# Patient Record
Sex: FEMALE
[De-identification: names, ages, dates, MRNs, and addresses within clinical notes are randomized per-mention and may not be internally consistent; named-entity substitution may affect disease eponyms.]

---

## 2018-01-26 PROBLEM — Z00.00 ENCOUNTER FOR PREVENTIVE HEALTH EXAMINATION: Status: ACTIVE | Noted: 2018-01-26

## 2018-01-31 ENCOUNTER — TRANSCRIPTION ENCOUNTER (OUTPATIENT)
Age: 58
End: 2018-01-31

## 2018-01-31 ENCOUNTER — APPOINTMENT (OUTPATIENT)
Dept: ORTHOPEDIC SURGERY | Facility: CLINIC | Age: 58
End: 2018-01-31
Payer: COMMERCIAL

## 2018-01-31 VITALS — BODY MASS INDEX: 29.71 KG/M2 | HEIGHT: 64 IN | WEIGHT: 174 LBS

## 2018-01-31 VITALS — DIASTOLIC BLOOD PRESSURE: 88 MMHG | SYSTOLIC BLOOD PRESSURE: 154 MMHG | HEART RATE: 91 BPM

## 2018-01-31 DIAGNOSIS — Z86.79 PERSONAL HISTORY OF OTHER DISEASES OF THE CIRCULATORY SYSTEM: ICD-10-CM

## 2018-01-31 DIAGNOSIS — Z87.39 PERSONAL HISTORY OF OTHER DISEASES OF THE MUSCULOSKELETAL SYSTEM AND CONNECTIVE TISSUE: ICD-10-CM

## 2018-01-31 DIAGNOSIS — Z78.9 OTHER SPECIFIED HEALTH STATUS: ICD-10-CM

## 2018-01-31 DIAGNOSIS — Z86.39 PERSONAL HISTORY OF OTHER ENDOCRINE, NUTRITIONAL AND METABOLIC DISEASE: ICD-10-CM

## 2018-01-31 PROCEDURE — 99204 OFFICE O/P NEW MOD 45 MIN: CPT | Mod: 25

## 2018-01-31 PROCEDURE — 20611 DRAIN/INJ JOINT/BURSA W/US: CPT | Mod: RT

## 2018-01-31 PROCEDURE — 73564 X-RAY EXAM KNEE 4 OR MORE: CPT | Mod: RT

## 2018-01-31 RX ORDER — DICLOFENAC SODIUM 50 MG/1
50 TABLET, DELAYED RELEASE ORAL
Qty: 60 | Refills: 2 | Status: ACTIVE | COMMUNITY
Start: 2018-01-31 | End: 1900-01-01

## 2018-01-31 RX ORDER — ATORVASTATIN CALCIUM 10 MG/1
10 TABLET, FILM COATED ORAL
Refills: 0 | Status: ACTIVE | COMMUNITY

## 2018-01-31 RX ORDER — HYALURONATE SOD, CROSS-LINKED 30 MG/3 ML
30 SYRINGE (ML) INTRAARTICULAR
Qty: 1 | Refills: 0 | Status: ACTIVE | OUTPATIENT
Start: 2018-01-31

## 2018-01-31 RX ORDER — LISINOPRIL 5 MG/1
5 TABLET ORAL
Refills: 0 | Status: ACTIVE | COMMUNITY

## 2018-01-31 RX ORDER — METFORMIN HYDROCHLORIDE 625 MG/1
TABLET ORAL
Refills: 0 | Status: ACTIVE | COMMUNITY

## 2018-01-31 RX ORDER — GLIPIZIDE 10 MG/1
10 TABLET ORAL
Refills: 0 | Status: ACTIVE | COMMUNITY

## 2018-01-31 RX ORDER — LIRAGLUTIDE 6 MG/ML
18 INJECTION SUBCUTANEOUS
Refills: 0 | Status: ACTIVE | COMMUNITY

## 2018-02-02 LAB
B PERT IGG+IGM PNL SER: CLEAR
COLOR FLD: YELLOW
CRYSTALS SNV QL MICRO: NORMAL
FLUID INTAKE SUBSTANCE CLASS: NORMAL
LYMPHOCYTES # FLD MANUAL: 25 %
MESOTHL CELL NFR FLD: 2 %
MONOS+MACROS NFR FLD MANUAL: 70 %
NEUTS SEG # FLD MANUAL: 3 %
RBC # FLD MANUAL: 2000 /UL
TOTAL CELLS COUNTED FLD: 212 /UL
TUBE TYPE: NORMAL

## 2018-02-07 LAB — BACTERIA FLD CULT: NORMAL

## 2019-01-09 ENCOUNTER — APPOINTMENT (OUTPATIENT)
Dept: ORTHOPEDIC SURGERY | Facility: CLINIC | Age: 59
End: 2019-01-09
Payer: COMMERCIAL

## 2019-01-09 DIAGNOSIS — M25.461 EFFUSION, RIGHT KNEE: ICD-10-CM

## 2019-01-09 DIAGNOSIS — M17.11 UNILATERAL PRIMARY OSTEOARTHRITIS, RIGHT KNEE: ICD-10-CM

## 2019-01-09 PROCEDURE — 99214 OFFICE O/P EST MOD 30 MIN: CPT | Mod: 25

## 2019-01-09 PROCEDURE — 73564 X-RAY EXAM KNEE 4 OR MORE: CPT | Mod: RT

## 2019-01-09 PROCEDURE — 20611 DRAIN/INJ JOINT/BURSA W/US: CPT | Mod: RT

## 2019-01-09 RX ORDER — DICLOFENAC SODIUM 50 MG/1
50 TABLET, DELAYED RELEASE ORAL
Qty: 60 | Refills: 1 | Status: ACTIVE | COMMUNITY
Start: 2019-01-09 | End: 1900-01-01

## 2019-01-09 RX ORDER — HYALURONATE SOD, CROSS-LINKED 30 MG/3 ML
30 SYRINGE (ML) INTRAARTICULAR
Qty: 1 | Refills: 0 | Status: ACTIVE | OUTPATIENT
Start: 2019-01-09

## 2019-01-09 NOTE — PHYSICAL EXAM
[de-identified] : Physical Examination\par General: well nourished, in no acute distress, alert and oriented to person, place and time\par Psychiatric: normal mood and affect, no abnormal movements or speech patterns\par Eyes: vision intact with glasses\par Throat: no thyromegaly\par Lymph: no enlarged nodes, no lymphedema in extremity\par Respiratory: no wheezing, no shortness of breath with ambulation\par Cardiac: no cardiac leg swelling, 2+ peripheral pulses\par Neurology: normal gross sensation in extremities to light touch\par Abdomen: soft, non-tender, tympanic, no masses\par \par Musculoskeletal Examination\par Cervical spine	Full painless range of motion and negative Spurling's test\par Ambulation	favoring right lower extremity with antalgic gait, - assistive devices\par \par Knee			Right			Left\par General\par      Swelling/Deformity	normal			normal	\par      Skin			healed portals			normal\par      Erythema		-			-\par      Standing Alignment	neutral			neutral\par      Effusion		small			none\par Range of Motion\par      Hip			full painless ROM		full painless ROM\par      Knee Flexion		110			130\par      Knee Extension	0			0\par Patella\par      J Sign		-			-\par      Quad Medial/Lateral	1/1 1/1\par      Apprehension		-			-\par      Bob's		-			-\par      Grind Sign		-			-\par      Crepitus		-			-\par Palpation\par      Medial Joint Line	+			-\par      Medial Fem Condyle	-			-\par      Lateral Joint Line	-			-\par      Quad Tendon		-			-\par      Patella Tendon	-			-\par      Medial Patella		-			-\par      Lateral Patella 	-			-\par      Posterior Knee	-			-\par Ligamentous\par      Varus @ 0° / 30°	-/-			-/-\par      Valgus @ 0° / 30°	-/-			-/-\par      Lachman		-			-\par      Pivot Shift		-			-\par      Anterior Drawer	-			-\par      Posterior Drawer	-			-\par Meniscus\par      Lyla		medial pain			-\par      Flexion Pinch		+			-\par Strength Examination/Atrophy\par      Hip Flexors 		5+			5+\par      Quadriceps		5+			5+\par      Hamstring		5+			5+\par      Tibialis Anterior	5+			5+\par      Achilles/Soleus	5+			5+\par Sensation\par      Deep Peroneal	normal			normal\par      Superficial Peroneal 	normal			normal\par      Sural  		normal			normal\par      Posterior Tibial 	normal			normal\par      Saphneous 		normal			normal\par Pulses\par      DP			2+			2+\par  [de-identified] : 4 views of the affected right knee 1-2018 demonstrate:\par There is moderate to severe medial asymmetric flexed knee narrowing\par No osteophytic lipping\par Small suprapatellar effusion\par No lateral patellofemoral joint space loss without evidence of tilt or subluxation on sunrise view\par Normal soft tissue density\par Otherwise normal osseous bone structure without fracture or dislocation\par \par \par 5 views of the affected Right knee (standing AP, flexing standing AP, 30degree flexed lateral, 0degree lateral, sunrise view) were ordered, obtained and evaluated by myself today and demonstrate:\par There is moderate to severe medial weightbearing asymmetric narrowing with cystic changes in both the femur and tibia\par Small osteophytic lipping\par Small suprapatellar effusion\par Mild lateral patellofemoral joint space loss without evidence of tilt [or] subluxation on sunrise view\par Normal soft tissue density\par Otherwise normal osseous bone structure without fracture or dislocation

## 2019-01-09 NOTE — HISTORY OF PRESENT ILLNESS
[de-identified] : CC right knee\par \par Consult Dr Yolette Mendes\par \par HPI 58 yo a female right HD presents for 1 year FU of chronic onset of 1-1/2 years of constant pain in the medial right knee that not related to the injury. She status post a right knee medial meniscectomy by myself December of 2016 one unemployed [employer]. The pain is worse, and rated a #5 out of 10, described as aching sharp throbbing, with radiation the ankle and both hips. Nothing makes the pain better and everything makes the pain worse. The patient reports associated symptoms of significant pain in multiple other joints of the body as well as swelling of the finger joints as well as locking of the knee swelling the knee instability of the knee and weakness of the knee. The patient denies pain at night affecting sleep, denies neck pain, and denies similar pain previously. The patient is under the care of a rheumatologist however the patient reports that she has not been diagnosed with a specific rheumatologic disease despite blood test. She does have pain diffusely throughout multiple joints in her left upper stomach as well as swelling of the PIP joints and her left hand with loss of range of motion.\par \par The patient has tried the following treatments:\par Activity modification	+\par Ice/Compression  	+\par Braces    		-\par Nsaids    		+\par Physical Therapy 	+\par Cortisone Injection	+ minimal to no improvement x2\par Arthroscopy		+ December 2016\par \par continued pain and swelling mostly medial\par \par Review of Systems is positive for the above musculoskeletal symptoms and is otherwise non-contributory for general, constitutional, psychiatric, neurologic, HEENT, cardiac, respiratory, gastrointestinal, reproductive, lymphatic, and dermatologic complaints.\par \par \par

## 2019-01-10 ENCOUNTER — MOBILE ON CALL (OUTPATIENT)
Age: 59
End: 2019-01-10

## 2019-01-10 LAB
B PERT IGG+IGM PNL SER: CLEAR
COLOR FLD: YELLOW
FLUID INTAKE SUBSTANCE CLASS: NORMAL
LYMPHOCYTES # FLD MANUAL: 44 %
MESOTHL CELL NFR FLD: 9 %
MONOS+MACROS NFR FLD MANUAL: 38 %
NEUTS SEG # FLD MANUAL: 9 %
RBC # FLD MANUAL: 23 /UL
SYCRY CLARITY: CLEAR
SYCRY COLOR: YELLOW
SYCRY ID: NORMAL
SYCRY TUBE: NORMAL
TOTAL CELLS COUNTED FLD: 444 /UL
TUBE TYPE: NORMAL
UNIDENT CELLS NFR FLD MANUAL: 0 %

## 2019-01-25 LAB — BACTERIA FLD CULT: NORMAL

## 2022-08-09 NOTE — DISCUSSION/SUMMARY
[de-identified] : Right knee status post medial meniscectomy arthroscopic\par Right knee osteoarthritis medial compartment\par Right knee effusion\par \par \par The patient and I discussed the causes and progression of degenerative joint disease of the knee. Models, diagrams and drawings were used in the discussion. Treatment can include conservative non-operative management and surgical options. Conservative management includes weight loss, activity modification, physical therapy to improve motion and strength in the muscles around the knee and the body's core, PO and topical NSAIDs, corticosteroid and/or viscosupplementation intra-articular injections. If the patient fails to improve with non-operative management, surgical management is possible. Depending upon the patient's age, BMI, activity level, degree and location of arthrosis different surgical options are possible including arthroscopic debridement with chondroplasty, high-tibial osteotomy, unicondylar/partial arthroplasty, and total joint arthroplasty.\par \par The patient was prescribed Diclofenac PO non-steroidal anti-inflammatory medication. 50mg tablets twice daily to be taken for at least 1-2 weeks in a row and then PRN afterwards. Risks and benefits were discussed and include but not limited to renal damage and GI ulceration and bleeding.  They were advised to take with food to limit stomach upset as well as warned to stop the medication if worsening gastric pain or dizziness or other side effects. Also to immediately stop the medication and seek appriate medical attention if any severe stomach ache, gastritis, black/red vomit, black/red stools or any other medical concern.\par \par refusing more PT\par \par Due to failure of prior non-operative modalities of treatment including physical therapy, activity modification, non-steroidal anti-inflammatory medications, and weight-loss with failure of multiple prior corticosteroid injections without appropriate improvement in symptoms and concern for too many frequent injections causing increased risk for adverse events, I am ordering a viscosupplementation injection Gel1 right and will obtain insurance authorization. The patient will be contacted by our authorization department over its status, copayment and when available for injection.\par \par \par for temporary pain relief and aspiration of fluid\par Procedure Note:\par \par Verbal consent was obtained for arthrocentesis and intra-articular corticosteroid injection of the RIGHT knee, after the risks and benefits were discussed with the patient. Potential adverse effects were discussed including but not limited to bleeding, skin/ joint infection, local skin reactions including bleeching, bruising, stiffness, soreness, vasovagal episodes, transient hyperglycemia, avascular necrosis, pseudo-septic type reactions, post injection joint pain, allergic reaction to product or anesthetic and other rare but potential adverse effects along with benefits including decreased pain and improved stability prior to obtaining verbal informed consent. It was also discussed that for some patients the treatment is ineffective and there are no guarantees that the patient will experience improvement as the result of the injection\par \par Sonosite 15-6 MHz linear transducer with live ultrasound guidance of the knee was necessary given the patient's BMI and local body habitus overlying and obscuring the identification of normal body bony anatomy used to identify the injection site and the depth of soft tissue envelope necessitating a longer than normal needle to reach the joint space, and to confirm the location of the needle tip and intra-articular delivery of the medication. Without the use of live ultrasound guidance the injection would have been more difficult and place the patient's neurovascular structures at risk from the longer needle needed to traverse the soft tissue envelope.\par \par A 6 inch bolster was placed underneath the knee to place the knee in a sliglty flexed position. The superolateral injection site was identified using the ultrasound probe to identify the suprapatellar space and superior boarder of the patella first longitudinally and then transversely. The injection site was marked and prepped with a ChloraPrep swab and anesthetized with ethylchloride skin anesthesia. Using sterile technique a 20g 3 1/2 in spinal needle with 3 cc total of 1cc 1% lidocaine without epinephrine, 1cc 0.25% Marcaine without epinephrine and 1cc of 40mg/mL Kenalog was passed through the injection site towards the suprapatella space under live ultrasound guidance and noted to penetrate the joint capsule with 20 cc of clear straw-colored fluid aspirated. The medication was injected without resistance under live ultrasound visualization and noted to flow into the suprapatellar joint space. The injection site was sterilely dressed, there was minimal blood loss. The patient tolerated this procedure without any complications done by myself. Images were recorded and saved. A sample was sent for culture crystal and cell count.\par \par The patient has been advised that if they notice any worsening of symptoms or any problems to contact me and seek care from a qualified medical professional. The patient was instructed to ice the knee and take NSAID medication on an as needed basis if the patient feels discomfort.\par \par She does have loss of range of motion of several joint and polyarthralgia suspicious for some type of inflammatory connective tissue disease.\par \par Follow up\par \par for visco bed rails